# Patient Record
Sex: FEMALE | URBAN - METROPOLITAN AREA
[De-identification: names, ages, dates, MRNs, and addresses within clinical notes are randomized per-mention and may not be internally consistent; named-entity substitution may affect disease eponyms.]

---

## 2019-05-30 ENCOUNTER — TELEPHONE (OUTPATIENT)
Dept: ORTHOPEDICS | Facility: CLINIC | Age: 54
End: 2019-05-30

## 2019-05-30 NOTE — TELEPHONE ENCOUNTER
Spoke to pt, informed we received her referral from [facility] for Lefr hip replacement. Explained that her BMI needs to below 40 to qualify for the program. Her ideal weight is 234, she will need to lose 36 lbs. Informed her we will continue to collect records and RN will contact her with [facility] to assist with weight loss resourses. Once she reaches her goal weight, [facility] will notify us and we will continue the process. Pt verbalized understanding.